# Patient Record
Sex: FEMALE | Race: WHITE | NOT HISPANIC OR LATINO | URBAN - METROPOLITAN AREA
[De-identification: names, ages, dates, MRNs, and addresses within clinical notes are randomized per-mention and may not be internally consistent; named-entity substitution may affect disease eponyms.]

---
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
## 2023-01-05 ENCOUNTER — OFFICE (OUTPATIENT)
Dept: URBAN - METROPOLITAN AREA CLINIC 11 | Facility: CLINIC | Age: 18
End: 2023-01-05

## 2023-01-05 VITALS
OXYGEN SATURATION: 99 % | HEIGHT: 65 IN | DIASTOLIC BLOOD PRESSURE: 73 MMHG | SYSTOLIC BLOOD PRESSURE: 118 MMHG | HEART RATE: 66 BPM | WEIGHT: 114 LBS

## 2023-01-05 DIAGNOSIS — R11.0 NAUSEA: ICD-10-CM

## 2023-01-05 DIAGNOSIS — F34.1 DYSTHYMIC DISORDER: ICD-10-CM

## 2023-01-05 DIAGNOSIS — K21.9 GASTRO-ESOPHAGEAL REFLUX DISEASE WITHOUT ESOPHAGITIS: ICD-10-CM

## 2023-01-05 PROCEDURE — 99213 OFFICE O/P EST LOW 20 MIN: CPT | Performed by: STUDENT IN AN ORGANIZED HEALTH CARE EDUCATION/TRAINING PROGRAM

## 2023-01-05 RX ORDER — ONDANSETRON 4 MG/1
TABLET, ORALLY DISINTEGRATING ORAL
Qty: 20 | Refills: 3 | Status: COMPLETED
Start: 2023-01-05 | End: 2023-06-01

## 2023-01-05 RX ORDER — PANTOPRAZOLE 20 MG/1
TABLET, DELAYED RELEASE ORAL
Qty: 180 | Refills: 2 | Status: ACTIVE

## 2023-01-05 NOTE — SERVICENOTES
the patient has persistent nausea even while taking her pantoprazole, however she reports that is improved so I will optimize her dose today on b.i.d. dosing taken correctly.  Continue to avoid NSAIDs.  Will also add p.r.n. Zofran for breakthrough nausea.  She likely has functional nausea likely related to her underlying mood disorder and history of eating disorder.  Will follow up.

## 2023-01-05 NOTE — SERVICEHPINOTES
Ms. Dario Mendoza is a 17-year-old white female with a past medical history of eating disorder in the remote past, anxiety and depression, who presents to the GI clinic as a referral for chronic nausea and lower abdominal pain.
br
br Clinic visit 8/26/22:
brPatient reports that she has had nausea chronically for as long as she can remember but it has worsened since January 2022, she also has lower abdominal pain after meals, 6/10 on the pain scale, has been occurring since middle school, improved with a bowel movement and improved with going to sleep.  She reports she has always dealt with this but she recently passed out at work in May 2022 after eating and standing while talking to a customer.  She was evaluated  by her PCP for possible hypoglycemia, per the patient and mother the patient had fairly normal blood sugar levels and a CT scan of the pancreas this month which was normal.  She was referred to an endocrinologist Dr. Mendoza  who told her her kidney and blood counts were normal.  Mother reports that the patient has had between 1 and 4 bowel movements a day that are liquid for many years, no blood in the stool but they have been black in color over the last 3 weeks.  She has taken some Pepto-Bismol intermittently and is not on iron pills.  She also endorses no rapid weight loss or weight gain, but she does take ibuprofen 1 to 2 times a day for her abdominal pain for the last couple of weeks.  Grandfather had esophageal cancer, and her other grandfather, mother, and father all had colon polyps.  She has not used marijuana in many months, she does have a pipe tobacco, she does not drink alcohol.  No prior abdominal surgeries.  She reports that she has been on Trintellix for her anxiety and depression for several years and this sometimes gives her nausea but it is usually immediately after taking the medication.  She eats dinner at about 8:00 p.m. at night, goes to bed at 3 or 4 in the morning and sleeps until noon due to insomnia.  She has intermittently used over-the-counter PPIs but not taken correctly.
br
br    EGD/colonoscopy  8/30/22:
br
Normal EGD with normal gastric bx, Normal colonoscopy with normal TI and random bx.
br
brClinic visit 1/5/23:
br
  Patient reports that since I saw her the last time she has had persistent nausea all day.  This has been ongoing for several years.  She reports it is worse after she takes trentellix but she feels she cannot stop this medication or changes because her depression has been so difficult to control on other medications and she tried so many previous medications.  she is not taking any NSAIDs.  She endorses that her nausea has improved since starting the pantoprazole once a day.  No blood in the stool or black tarry stool.  She does take occasional Pepto-Bismol pill which helps her stomach nausea.  Her mother is here with her today.  Fortunately her EGD and colonoscopy were reassuring for no inflammatory bowel disease.

## 2023-06-01 ENCOUNTER — OFFICE (OUTPATIENT)
Dept: URBAN - METROPOLITAN AREA CLINIC 11 | Facility: CLINIC | Age: 18
End: 2023-06-01

## 2023-06-01 VITALS
OXYGEN SATURATION: 100 % | WEIGHT: 116 LBS | HEART RATE: 80 BPM | HEIGHT: 65 IN | SYSTOLIC BLOOD PRESSURE: 117 MMHG | BODY MASS INDEX: 19.33 KG/M2 | DIASTOLIC BLOOD PRESSURE: 75 MMHG

## 2023-06-01 DIAGNOSIS — R11.0 NAUSEA: ICD-10-CM

## 2023-06-01 DIAGNOSIS — F34.1 DYSTHYMIC DISORDER: ICD-10-CM

## 2023-06-01 DIAGNOSIS — K21.9 GASTRO-ESOPHAGEAL REFLUX DISEASE WITHOUT ESOPHAGITIS: ICD-10-CM

## 2023-06-01 PROCEDURE — 99212 OFFICE O/P EST SF 10 MIN: CPT | Performed by: STUDENT IN AN ORGANIZED HEALTH CARE EDUCATION/TRAINING PROGRAM

## 2023-06-01 RX ORDER — PANTOPRAZOLE 20 MG/1
TABLET, DELAYED RELEASE ORAL
Qty: 180 | Refills: 2 | Status: ACTIVE

## 2023-06-01 NOTE — SERVICENOTES
Patient is doing remarkably well on optimized PPI dose.  I offered to reduce her dose down to once a day today but she is reluctant to change her medications at this time since she has found a good balance of her symptoms.  Will reassess her dosing of pantoprazole in 6 months when I see her the next time.  Patient instructed to call me if she needs her appointment moved up between now and then.  Continue to avoid NSAIDs.  Follow up with multiple specialists.  All questions  by the patient and her mother were addressed.

## 2023-06-01 NOTE — SERVICEHPINOTES
Ms. Dario Mendoza is a 17-year-old white female with a past medical history of eating disorder in the remote past, anxiety and depression, who presents to the GI clinic as a referral for chronic nausea and lower abdominal pain.Clinic visit 8/26/22:brPatient reports that she has had nausea chronically for as long as she can remember but it has worsened since January 2022, she also has lower abdominal pain after meals, 6/10 on the pain scale, has been occurring since middle school, improved with a bowel movement and improved with going to sleep.  She reports she has always dealt with this but she recently passed out at work in May 2022 after eating and standing while talking to a customer.  She was evaluated  by her PCP for possible hypoglycemia, per the patient and mother the patient had fairly normal blood sugar levels and a CT scan of the pancreas this month which was normal.  She was referred to an endocrinologist Dr. Mendoza  who told her her kidney and blood counts were normal.  Mother reports that the patient has had between 1 and 4 bowel movements a day that are liquid for many years, no blood in the stool but they have been black in color over the last 3 weeks.  She has taken some Pepto-Bismol intermittently and is not on iron pills.  She also endorses no rapid weight loss or weight gain, but she does take ibuprofen 1 to 2 times a day for her abdominal pain for the last couple of weeks.  Grandfather had esophageal cancer, and her other grandfather, mother, and father all had colon polyps.  She has not used marijuana in many months, she does have a pipe tobacco, she does not drink alcohol.  No prior abdominal surgeries.  She reports that she has been on Trintellix for her anxiety and depression for several years and this sometimes gives her nausea but it is usually immediately after taking the medication.  She eats dinner at about 8:00 p.m. at night, goes to bed at 3 or 4 in the morning and sleeps until noon due to insomnia.  She has intermittently used over-the-counter PPIs but not taken correctly.EGD/colonoscopy8/30/22:brNormal EGD with normal gastric bx, Normal colonoscopy with normal TI and random bx.Clinic visit 1/5/23:br  Patient reports that since I saw her the last time she has had persistent nausea all day.  This has been ongoing for several years.  She reports it is worse after she takes trentellix but she feels she cannot stop this medication or changes because her depression has been so difficult to control on other medications and she tried so many previous medications.  she is not taking any NSAIDs.  She endorses that her nausea has improved since starting the pantoprazole once a day.  No blood in the stool or black tarry stool.  She does take occasional Pepto-Bismol pill which helps her stomach nausea.  Her mother is here with her today.  Fortunately her EGD and colonoscopy were reassuring for no inflammatory bowel disease.
br
br    Clinic visit 6/1/23:
br Patient reports that she feels like her nausea and reflux have improved on twice daily PPI dosing.  She also has seen neurologist and rheumatologist since last visit and they have diagnosed her with migraines with aura.  She is now on gabapentin for the last 2 weeks to see if this can also help her symptoms.  She is using Zofran very infrequently and not taking any NSAIDs.  She reports her appetite is better and she is actually gaining weight.  She is trying to get on a more regular sleep schedule.  Her mother is with her today and they report she is feeling better than she has in the last several months.  She is still on the same dose of Trintellix and doing well from a depression standpoint.

## 2023-12-14 ENCOUNTER — OFFICE (OUTPATIENT)
Dept: URBAN - METROPOLITAN AREA CLINIC 11 | Facility: CLINIC | Age: 18
End: 2023-12-14

## 2023-12-14 VITALS
BODY MASS INDEX: 19.16 KG/M2 | OXYGEN SATURATION: 100 % | HEIGHT: 65 IN | HEART RATE: 85 BPM | SYSTOLIC BLOOD PRESSURE: 111 MMHG | WEIGHT: 115 LBS | DIASTOLIC BLOOD PRESSURE: 69 MMHG

## 2023-12-14 DIAGNOSIS — R11.0 NAUSEA: ICD-10-CM

## 2023-12-14 DIAGNOSIS — K21.9 GASTRO-ESOPHAGEAL REFLUX DISEASE WITHOUT ESOPHAGITIS: ICD-10-CM

## 2023-12-14 DIAGNOSIS — F34.1 DYSTHYMIC DISORDER: ICD-10-CM

## 2023-12-14 PROCEDURE — 99214 OFFICE O/P EST MOD 30 MIN: CPT | Performed by: STUDENT IN AN ORGANIZED HEALTH CARE EDUCATION/TRAINING PROGRAM

## 2023-12-14 RX ORDER — PANTOPRAZOLE 20 MG/1
TABLET, DELAYED RELEASE ORAL
Qty: 180 | Refills: 2 | Status: ACTIVE

## 2023-12-14 RX ORDER — ONDANSETRON 4 MG/1
TABLET, ORALLY DISINTEGRATING ORAL
Qty: 20 | Refills: 4 | Status: ACTIVE

## 2023-12-14 NOTE — SERVICEHPINOTES
Ms. Dario Mendoza is a 18-year-old white female with a past medical history of eating disorder in the remote past, anxiety and depression, who initially presented to the GI clinic as a referral for chronic nausea and lower abdominal pain.Clinic visit 8/26/22:brPatient reports that she has had nausea chronically for as long as she can remember but it has worsened since January 2022, she also has lower abdominal pain after meals, 6/10 on the pain scale, has been occurring since middle school, improved with a bowel movement and improved with going to sleep.  She reports she has always dealt with this but she recently passed out at work in May 2022 after eating and standing while talking to a customer.  She was evaluated  by her PCP for possible hypoglycemia, per the patient and mother the patient had fairly normal blood sugar levels and a CT scan of the pancreas this month which was normal.  She was referred to an endocrinologist Dr. Mendoza  who told her her kidney and blood counts were normal.  Mother reports that the patient has had between 1 and 4 bowel movements a day that are liquid for many years, no blood in the stool but they have been black in color over the last 3 weeks.  She has taken some Pepto-Bismol intermittently and is not on iron pills.  She also endorses no rapid weight loss or weight gain, but she does take ibuprofen 1 to 2 times a day for her abdominal pain for the last couple of weeks.  Grandfather had esophageal cancer, and her other grandfather, mother, and father all had colon polyps.  She has not used marijuana in many months, she does have a pipe tobacco, she does not drink alcohol.  No prior abdominal surgeries.  She reports that she has been on Trintellix for her anxiety and depression for several years and this sometimes gives her nausea but it is usually immediately after taking the medication.  She eats dinner at about 8:00 p.m. at night, goes to bed at 3 or 4 in the morning and sleeps until noon due to insomnia.  She has intermittently used over-the-counter PPIs but not taken correctly.EGD/colonoscopy8/30/22:brNormal EGD with normal gastric bx, Normal colonoscopy with normal TI and random bx.Clinic visit 1/5/23:br  Patient reports that since I saw her the last time she has had persistent nausea all day.  This has been ongoing for several years.  She reports it is worse after she takes trentellix but she feels she cannot stop this medication or changes because her depression has been so difficult to control on other medications and she tried so many previous medications.  she is not taking any NSAIDs.  She endorses that her nausea has improved since starting the pantoprazole once a day.  No blood in the stool or black tarry stool.  She does take occasional Pepto-Bismol pill which helps her stomach nausea.  Her mother is here with her today.  Fortunately her EGD and colonoscopy were reassuring for no inflammatory bowel disease.Clinic visit 6/1/23:brPatient reports that she feels like her nausea and reflux have improved on twice daily PPI dosing.  She also has seen neurologist and rheumatologist since last visit and they have diagnosed her with migraines with aura.  She is now on gabapentin for the last 2 weeks to see if this can also help her symptoms.  She is using Zofran very infrequently and not taking any NSAIDs.  She reports her appetite is better and she is actually gaining weight.  She is trying to get on a more regular sleep schedule.  Her mother is with her today and they report she is feeling better than she has in the last several months.  She is still on the same dose of Trintellix and doing well from a depression standpoint.
juan carlos rangel Clinic visit 12/14/2023: 
br   Patient continues to do very well on her current regimen pantoprazole 40 mg b.i.d. and Zofran as needed.  She reports that she only uses the Zofran about once a week.  she is not using any NSAIDs but occasionally uses Tylenol.  she recently was diagnosed with PVCs by her cardiologist Dr. Matos.  she is not currently on any medication for these.  She changed her birth control recently because it was increasing her risk of migraines.  She is having normal bowel movements.  No vomiting but just occasional nausea.  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  She just finished her finals at the [employer] for the semester.  She has had previous workup including CT scans, EGD and colonoscopy all of which were unremarkable.juan carlos rangel

## 2023-12-14 NOTE — SERVICENOTES
Patient continues to do well on b.i.d. dosing of pantoprazole.  She is willing to try to reduce the dose from  40 mg to 20 mg b.i.d..  I think we should take it slow as we taper this down since she has had such good results so far.  We will keep her on 20 mg b.i.d. for the next 3 months and will reassess at next visit how her symptoms are doing.  She can continue using Zofran as needed.  Continue to avoid NSAIDs.  Discussed with her mother as well.  All questions addressed.  I have reviewed her previous medical records for her visit today. I had a long conversation today educating the patient on the long-term risks and benefits of PPIs.

## 2024-07-24 ENCOUNTER — OFFICE (OUTPATIENT)
Dept: URBAN - METROPOLITAN AREA CLINIC 11 | Facility: CLINIC | Age: 19
End: 2024-07-24

## 2024-07-24 VITALS
SYSTOLIC BLOOD PRESSURE: 102 MMHG | HEIGHT: 65 IN | DIASTOLIC BLOOD PRESSURE: 69 MMHG | OXYGEN SATURATION: 99 % | HEART RATE: 43 BPM | WEIGHT: 106 LBS

## 2024-07-24 DIAGNOSIS — K58.0 IRRITABLE BOWEL SYNDROME WITH DIARRHEA: ICD-10-CM

## 2024-07-24 DIAGNOSIS — R11.0 NAUSEA: ICD-10-CM

## 2024-07-24 DIAGNOSIS — K21.9 GASTRO-ESOPHAGEAL REFLUX DISEASE WITHOUT ESOPHAGITIS: ICD-10-CM

## 2024-07-24 PROCEDURE — 99214 OFFICE O/P EST MOD 30 MIN: CPT | Performed by: STUDENT IN AN ORGANIZED HEALTH CARE EDUCATION/TRAINING PROGRAM

## 2024-07-24 RX ORDER — PANTOPRAZOLE 20 MG/1
TABLET, DELAYED RELEASE ORAL
Qty: 180 | Refills: 2 | Status: ACTIVE

## 2024-07-24 NOTE — SERVICENOTES
will check food allergy panel and gave her samples for 14 day course of Xifaxan.  Return to clinic in 4 months or sooner if needed.  Renew pantoprazole.  Discussed with her the risks and benefits of the  medication and she wants to proceed.

## 2024-07-24 NOTE — SERVICEHPINOTES
Ms. Dario Mendoza is a 18-year-old white female with a past medical history of eating disorder in the remote past, anxiety and depression, who initially presented to the GI clinic as a referral for chronic nausea and lower abdominal pain.Clinic visit 8/26/22:brPatient reports that she has had nausea chronically for as long as she can remember but it has worsened since January 2022, she also has lower abdominal pain after meals, 6/10 on the pain scale, has been occurring since middle school, improved with a bowel movement and improved with going to sleep.  She reports she has always dealt with this but she recently passed out at work in May 2022 after eating and standing while talking to a customer.  She was evaluated  by her PCP for possible hypoglycemia, per the patient and mother the patient had fairly normal blood sugar levels and a CT scan of the pancreas this month which was normal.  She was referred to an endocrinologist Dr. Mendoza  who told her her kidney and blood counts were normal.  Mother reports that the patient has had between 1 and 4 bowel movements a day that are liquid for many years, no blood in the stool but they have been black in color over the last 3 weeks.  She has taken some Pepto-Bismol intermittently and is not on iron pills.  She also endorses no rapid weight loss or weight gain, but she does take ibuprofen 1 to 2 times a day for her abdominal pain for the last couple of weeks.  Grandfather had esophageal cancer, and her other grandfather, mother, and father all had colon polyps.  She has not used marijuana in many months, she does have a pipe tobacco, she does not drink alcohol.  No prior abdominal surgeries.  She reports that she has been on Trintellix for her anxiety and depression for several years and this sometimes gives her nausea but it is usually immediately after taking the medication.  She eats dinner at about 8:00 p.m. at night, goes to bed at 3 or 4 in the morning and sleeps until noon due to insomnia.  She has intermittently used over-the-counter PPIs but not taken correctly.EGD/colonoscopy8/30/22:brNormal EGD with normal gastric bx, Normal colonoscopy with normal TI and random bx.Clinic visit 1/5/23:br  Patient reports that since I saw her the last time she has had persistent nausea all day.  This has been ongoing for several years.  She reports it is worse after she takes trentellix but she feels she cannot stop this medication or changes because her depression has been so difficult to control on other medications and she tried so many previous medications.  she is not taking any NSAIDs.  She endorses that her nausea has improved since starting the pantoprazole once a day.  No blood in the stool or black tarry stool.  She does take occasional Pepto-Bismol pill which helps her stomach nausea.  Her mother is here with her today.  Fortunately her EGD and colonoscopy were reassuring for no inflammatory bowel disease.Clinic visit 6/1/23:brPatient reports that she feels like her nausea and reflux have improved on twice daily PPI dosing.  She also has seen neurologist and rheumatologist since last visit and they have diagnosed her with migraines with aura.  She is now on gabapentin for the last 2 weeks to see if this can also help her symptoms.  She is using Zofran very infrequently and not taking any NSAIDs.  She reports her appetite is better and she is actually gaining weight.  She is trying to get on a more regular sleep schedule.  Her mother is with her today and they report she is feeling better than she has in the last several months.  She is still on the same dose of Trintellix and doing well from a depression standpoint.Clinic visit 12/14/2023: br  Patient continues to do very well on her current regimen pantoprazole 40 mg b.i.d. and Zofran as needed.  She reports that she only uses the Zofran about once a week.  she is not using any NSAIDs but occasionally uses Tylenol.  she recently was diagnosed with PVCs by her cardiologist Dr. Matos.  she is not currently on any medication for these.  She changed her birth control recently because it was increasing her risk of migraines.  She is having normal bowel movements.  No vomiting but just occasional nausea.  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  She just finished her finals at the [employer] for the semester.  She has had previous workup including CT scans, EGD and colonoscopy all of which were unremarkable. 
br
br    clinic visit 07/24/2024: 
br Dicyclomine has helped but she has not been able to obtain Xifaxan yet.  Still taking pantoprazole and needs a refill.  Still having variable bowel habits and is interested in allergy testing today.

## 2024-08-16 PROBLEM — K52.89: Status: ACTIVE | Noted: 2022-08-30

## 2024-08-16 LAB
ALPHA-GAL IGE PANEL: CLASS DESCRIPTION: (no result)
ALPHA-GAL IGE PANEL: F026-IGE PORK: <0.1 KU/L
ALPHA-GAL IGE PANEL: F027-IGE BEEF: <0.1 KU/L
ALPHA-GAL IGE PANEL: F088-IGE LAMB: <0.1 KU/L
ALPHA-GAL IGE PANEL: IMMUNOGLOBULIN E, TOTAL: 14 IU/ML (ref 6–495)
ALPHA-GAL IGE PANEL: O215-IGE ALPHA-GAL: <0.1 KU/L
IGE FOOD PROF W/COMPONENT RFLX: F001-IGE EGG WHITE: <0.1 KU/L
IGE FOOD PROF W/COMPONENT RFLX: F002-IGE MILK: <0.1 KU/L
IGE FOOD PROF W/COMPONENT RFLX: F003-IGE CODFISH: <0.1 KU/L
IGE FOOD PROF W/COMPONENT RFLX: F004-IGE WHEAT: <0.1 KU/L
IGE FOOD PROF W/COMPONENT RFLX: F008-IGE CORN: <0.1 KU/L
IGE FOOD PROF W/COMPONENT RFLX: F010-IGE SESAME SEED: 0.11 KU/L (ref 0–?)
IGE FOOD PROF W/COMPONENT RFLX: F013-IGE PEANUT: <0.1 KU/L
IGE FOOD PROF W/COMPONENT RFLX: F014-IGE SOYBEAN: <0.1 KU/L
IGE FOOD PROF W/COMPONENT RFLX: F024-IGE SHRIMP: <0.1 KU/L
IGE FOOD PROF W/COMPONENT RFLX: F207-IGE CLAM: <0.1 KU/L
IGE FOOD PROF W/COMPONENT RFLX: F256-IGE WALNUT: <0.1 KU/L
IGE FOOD PROF W/COMPONENT RFLX: F338-IGE SCALLOP: <0.1 KU/L

## 2025-03-26 ENCOUNTER — OFFICE (OUTPATIENT)
Dept: URBAN - METROPOLITAN AREA CLINIC 11 | Facility: CLINIC | Age: 20
End: 2025-03-26

## 2025-03-26 VITALS
OXYGEN SATURATION: 90 % | HEIGHT: 65 IN | WEIGHT: 115 LBS | SYSTOLIC BLOOD PRESSURE: 121 MMHG | HEART RATE: 93 BPM | DIASTOLIC BLOOD PRESSURE: 63 MMHG

## 2025-03-26 DIAGNOSIS — F34.1 DYSTHYMIC DISORDER: ICD-10-CM

## 2025-03-26 DIAGNOSIS — R11.0 NAUSEA: ICD-10-CM

## 2025-03-26 DIAGNOSIS — K58.0 IRRITABLE BOWEL SYNDROME WITH DIARRHEA: ICD-10-CM

## 2025-03-26 DIAGNOSIS — M79.7 FIBROMYALGIA: ICD-10-CM

## 2025-03-26 DIAGNOSIS — K21.9 GASTRO-ESOPHAGEAL REFLUX DISEASE WITHOUT ESOPHAGITIS: ICD-10-CM

## 2025-03-26 PROCEDURE — 99214 OFFICE O/P EST MOD 30 MIN: CPT | Performed by: STUDENT IN AN ORGANIZED HEALTH CARE EDUCATION/TRAINING PROGRAM

## 2025-03-26 RX ORDER — PANTOPRAZOLE 40 MG/1
TABLET, DELAYED RELEASE ORAL
Qty: 180 | Refills: 2 | Status: ACTIVE

## 2025-03-26 NOTE — SERVICEHPINOTES
Ms. Dario Mendoza is a 19-year-old white female with a past medical history of eating disorder in the remote past, anxiety and depression, who initially presented to the GI clinic as a referral for chronic nausea and lower abdominal pain.Clinic visit 8/26/22:brPatient reports that she has had nausea chronically for as long as she can remember but it has worsened since January 2022, she also has lower abdominal pain after meals, 6/10 on the pain scale, has been occurring since middle school, improved with a bowel movement and improved with going to sleep. She reports she has always dealt with this but she recently passed out at work in May 2022 after eating and standing while talking to a customer. She was evaluated by her PCP for possible hypoglycemia, per the patient and mother the patient had fairly normal blood sugar levels and a CT scan of the pancreas this month which was normal. She was referred to an endocrinologist Dr. Mendoza who told her her kidney and blood counts were normal. Mother reports that the patient has had between 1 and 4 bowel movements a day that are liquid for many years, no blood in the stool but they have been black in color over the last 3 weeks. She has taken some Pepto-Bismol intermittently and is not on iron pills. She also endorses no rapid weight loss or weight gain, but she does take ibuprofen 1 to 2 times a day for her abdominal pain for the last couple of weeks. Grandfather had esophageal cancer, and her other grandfather, mother, and father all had colon polyps. She has not used marijuana in many months, she does have a pipe tobacco, she does not drink alcohol. No prior abdominal surgeries. She reports that she has been on Trintellix for her anxiety and depression for several years and this sometimes gives her nausea but it is usually immediately after taking the medication. She eats dinner at about 8:00 p.m. at night, goes to bed at 3 or 4 in the morning and sleeps until noon due to insomnia. She has intermittently used over-the-counter PPIs but not taken correctly.EGD/colonoscopy8/30/22:brNormal EGD with normal gastric bx, Normal colonoscopy with normal TI and random bx.Clinic visit 1/5/23:brPatient reports that since I saw her the last time she has had persistent nausea all day. This has been ongoing for several years. She reports it is worse after she takes trentellix but she feels she cannot stop this medication or changes because her depression has been so difficult to control on other medications and she tried so many previous medications. she is not taking any NSAIDs. She endorses that her nausea has improved since starting the pantoprazole once a day. No blood in the stool or black tarry stool. She does take occasional Pepto-Bismol pill which helps her stomach nausea. Her mother is here with her today. Fortunately her EGD and colonoscopy were reassuring for no inflammatory bowel disease.Clinic visit 6/1/23:Darby reports that she feels like her nausea and reflux have improved on twice daily PPI dosing. She also has seen neurologist and rheumatologist since last visit and they have diagnosed her with migraines with aura. She is now on gabapentin for the last 2 weeks to see if this can also help her symptoms. She is using Zofran very infrequently and not taking any NSAIDs. She reports her appetite is better and she is actually gaining weight. She is trying to get on a more regular sleep schedule. Her mother is with her today and they report she is feeling better than she has in the last several months. She is still on the same dose of Trintellix and doing well from a depression standpoint.Clinic visit 12/14/2023:Darby continues to do very well on her current regimen pantoprazole 40 mg b.i.d. and Zofran as needed. She reports that she only uses the Zofran about once a week. she is not using any NSAIDs but occasionally uses Tylenol. she recently was diagnosed with PVCs by her cardiologist Dr. Matos. she is not currently on any medication for these. She changed her birth control recently because it was increasing her risk of migraines. She is having normal bowel movements. No vomiting but just occasional nausea. 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 She just finished her finals at the [employer] for the semester. She has had previous workup including CT scans, EGD and colonoscopy all of which were unremarkable.clinic visit 07/24/2024:brDicyclomine has helped but she has not been able to obtain Xifaxan yet. Still taking pantoprazole and needs a refill. Still having variable bowel habits and is interested in allergy testing today.Clinic Visit 11/27/2024:brThe patient has a history of SIBO and has completed two courses of Xifaxan, which has led to some improvement in symptoms, though bloating and diarrhea persist. Diarrhea frequency varies, with some days having no bowel movements and other days having up to four or five, often triggered by certain foods. Despite food allergy testing, no specific intolerances have been identified except for a weak positive to sesame seeds. She is currently taking colestipol, two pills in the morning, which has helped manage diarrhea. She also takes pantoprazole 40 mg, which controls heartburn and acid reflux. Zofran is used for nausea as needed, though it is not required frequently. Dicyclomine is taken as needed, approximately two pills every other day, to manage abdominal discomfort. No vomiting has occurred in over ten years, despite frequent nausea. No trouble swallowing, fevers, or chills. Bloating is more prominent on the left side and sometimes alleviated by bowel movements. Past diagnostic workup includes an EGD and a colonoscopy in 2022, which showed unremarkable colonic mucosa, ruling out Crohn's disease and ulcerative colitis. She denies NSAID use.
br
br     clinic visit 03/26/2025: 
br Patient reports that since last visit she was seen by rheumatologist diagnosed with fibromyalgia.  They been titrating around her medications and she is currently taking gabapentin and Cymbalta.  She reports that she has doing a lot better.  Nausea comes in waves but she can go weeks or even months without having these episodes.  She is continuing to take pantoprazole 40 mg twice a day.  She uses Zofran as needed.  Colestipol 2 pills at a time has helped her diarrhea greatly.  She is by herself in the clinic today.  Mom was out of town.

## 2025-03-26 NOTE — SERVICENOTES
continue patient on PPI b.i.d..  I also encouraged her to continue using Zofran for breakthrough nausea.  I think as we continue to treat her fibromyalgia through her rheumatologist then the patient is going to continue to do well.  Overall she is better today.  Follow up in 6 months or sooner if needed.  All questions addressed.